# Patient Record
Sex: FEMALE | Race: OTHER
[De-identification: names, ages, dates, MRNs, and addresses within clinical notes are randomized per-mention and may not be internally consistent; named-entity substitution may affect disease eponyms.]

---

## 2019-03-01 ENCOUNTER — HOSPITAL ENCOUNTER (OUTPATIENT)
Dept: HOSPITAL 15 - LDRP | Age: 29
Setting detail: OBSERVATION
Discharge: HOME | DRG: 566 | End: 2019-03-01
Attending: SPECIALIST | Admitting: SPECIALIST
Payer: MEDICAID

## 2019-03-01 VITALS — BODY MASS INDEX: 25.52 KG/M2 | WEIGHT: 144 LBS | HEIGHT: 63 IN

## 2019-03-01 DIAGNOSIS — Z3A.38: ICD-10-CM

## 2019-03-01 DIAGNOSIS — O99.113: Primary | ICD-10-CM

## 2019-03-01 PROCEDURE — 81002 URINALYSIS NONAUTO W/O SCOPE: CPT

## 2019-03-01 PROCEDURE — 76818 FETAL BIOPHYS PROFILE W/NST: CPT

## 2019-03-01 PROCEDURE — 96372 THER/PROPH/DIAG INJ SC/IM: CPT

## 2019-03-01 PROCEDURE — 59025 FETAL NON-STRESS TEST: CPT

## 2019-03-02 ENCOUNTER — HOSPITAL ENCOUNTER (OUTPATIENT)
Dept: HOSPITAL 15 - LDRP | Age: 29
Setting detail: OBSERVATION
Discharge: HOME | DRG: 861 | End: 2019-03-02
Attending: SPECIALIST | Admitting: SPECIALIST
Payer: MEDICAID

## 2019-03-02 DIAGNOSIS — Z34.93: Primary | ICD-10-CM

## 2019-03-02 DIAGNOSIS — Z3A.35: ICD-10-CM

## 2019-03-02 PROCEDURE — 96361 HYDRATE IV INFUSION ADD-ON: CPT

## 2019-03-02 PROCEDURE — 76818 FETAL BIOPHYS PROFILE W/NST: CPT

## 2019-03-02 PROCEDURE — 59025 FETAL NON-STRESS TEST: CPT

## 2019-03-02 PROCEDURE — 81002 URINALYSIS NONAUTO W/O SCOPE: CPT

## 2019-03-02 PROCEDURE — 96366 THER/PROPH/DIAG IV INF ADDON: CPT

## 2019-03-03 ENCOUNTER — HOSPITAL ENCOUNTER (OUTPATIENT)
Dept: HOSPITAL 15 - LDRP | Age: 29
Setting detail: OBSERVATION
Discharge: HOME | DRG: 566 | End: 2019-03-03
Attending: SPECIALIST | Admitting: SPECIALIST
Payer: MEDICAID

## 2019-03-03 DIAGNOSIS — O41.03X0: Primary | ICD-10-CM

## 2019-03-03 DIAGNOSIS — Z3A.35: ICD-10-CM

## 2019-03-03 PROCEDURE — 59025 FETAL NON-STRESS TEST: CPT

## 2019-03-03 PROCEDURE — 76818 FETAL BIOPHYS PROFILE W/NST: CPT

## 2019-03-03 PROCEDURE — 81002 URINALYSIS NONAUTO W/O SCOPE: CPT

## 2019-03-06 ENCOUNTER — HOSPITAL ENCOUNTER (OUTPATIENT)
Dept: HOSPITAL 15 - LDRP | Age: 29
Setting detail: OBSERVATION
Discharge: HOME | DRG: 566 | End: 2019-03-06
Attending: SPECIALIST | Admitting: SPECIALIST
Payer: MEDICAID

## 2019-03-06 DIAGNOSIS — Z3A.36: ICD-10-CM

## 2019-03-06 DIAGNOSIS — O41.03X0: Primary | ICD-10-CM

## 2019-03-06 PROCEDURE — 76818 FETAL BIOPHYS PROFILE W/NST: CPT

## 2019-03-06 PROCEDURE — 59025 FETAL NON-STRESS TEST: CPT

## 2019-03-06 PROCEDURE — 81002 URINALYSIS NONAUTO W/O SCOPE: CPT

## 2019-03-07 ENCOUNTER — HOSPITAL ENCOUNTER (OUTPATIENT)
Dept: HOSPITAL 15 - LDRP | Age: 29
Setting detail: OBSERVATION
Discharge: HOME | DRG: 566 | End: 2019-03-07
Attending: SPECIALIST | Admitting: SPECIALIST
Payer: MEDICAID

## 2019-03-07 DIAGNOSIS — Z3A.32: ICD-10-CM

## 2019-03-07 DIAGNOSIS — O26.893: Primary | ICD-10-CM

## 2019-03-07 PROCEDURE — 81002 URINALYSIS NONAUTO W/O SCOPE: CPT

## 2019-03-07 PROCEDURE — 76818 FETAL BIOPHYS PROFILE W/NST: CPT

## 2019-03-07 PROCEDURE — 59025 FETAL NON-STRESS TEST: CPT

## 2019-03-09 ENCOUNTER — HOSPITAL ENCOUNTER (INPATIENT)
Dept: HOSPITAL 15 - LDRP | Age: 29
LOS: 3 days | Discharge: HOME | DRG: 540 | End: 2019-03-12
Attending: SPECIALIST | Admitting: SPECIALIST
Payer: MEDICAID

## 2019-03-09 VITALS — SYSTOLIC BLOOD PRESSURE: 135 MMHG | DIASTOLIC BLOOD PRESSURE: 93 MMHG

## 2019-03-09 VITALS — WEIGHT: 144 LBS | HEIGHT: 64 IN | BODY MASS INDEX: 24.59 KG/M2

## 2019-03-09 VITALS — SYSTOLIC BLOOD PRESSURE: 126 MMHG | DIASTOLIC BLOOD PRESSURE: 73 MMHG

## 2019-03-09 VITALS — DIASTOLIC BLOOD PRESSURE: 88 MMHG | SYSTOLIC BLOOD PRESSURE: 125 MMHG

## 2019-03-09 VITALS — SYSTOLIC BLOOD PRESSURE: 127 MMHG | DIASTOLIC BLOOD PRESSURE: 76 MMHG

## 2019-03-09 DIAGNOSIS — O36.5930: Primary | ICD-10-CM

## 2019-03-09 DIAGNOSIS — Z3A.36: ICD-10-CM

## 2019-03-09 DIAGNOSIS — O41.03X0: ICD-10-CM

## 2019-03-09 LAB
ALBUMIN SERPL-MCNC: 2.5 G/DL (ref 3.4–5)
ALCOHOL, URINE: < 3 MG/DL (ref 0–5)
ALP SERPL-CCNC: 103 U/L (ref 45–117)
ALT SERPL-CCNC: 15 U/L (ref 13–56)
AMPHETAMINES UR QL SCN: NEGATIVE
ANION GAP SERPL CALCULATED.3IONS-SCNC: 11 MMOL/L (ref 5–15)
APTT PPP: 26.2 SEC (ref 23.78–33.04)
BARBITURATES UR QL SCN: NEGATIVE
BENZODIAZ UR QL SCN: NEGATIVE
BILIRUB SERPL-MCNC: 0.2 MG/DL (ref 0.2–1)
BUN SERPL-MCNC: 13 MG/DL (ref 7–18)
BUN/CREAT SERPL: 22.8
BZE UR QL SCN: NEGATIVE
CALCIUM SERPL-MCNC: 8.5 MG/DL (ref 8.5–10.1)
CANNABINOIDS UR QL SCN: NEGATIVE
CHLORIDE SERPL-SCNC: 106 MMOL/L (ref 98–107)
CO2 SERPL-SCNC: 20 MMOL/L (ref 21–32)
GLUCOSE SERPL-MCNC: 101 MG/DL (ref 74–106)
HCT VFR BLD AUTO: 35.4 % (ref 36–46)
HGB BLD-MCNC: 11.9 G/DL (ref 12.2–16.2)
INR PPP: 0.83 (ref 0.9–1.15)
MCH RBC QN AUTO: 30.1 PG (ref 28–32)
MCV RBC AUTO: 89.5 FL (ref 80–100)
OPIATES UR QL SCN: NEGATIVE
PCP UR QL SCN: NEGATIVE
POTASSIUM SERPL-SCNC: 4.5 MMOL/L (ref 3.5–5.1)
PROT SERPL-MCNC: 6.6 G/DL (ref 6.4–8.2)
PROTHROMBIN TIME: 9 SEC (ref 9.27–12.13)
SODIUM SERPL-SCNC: 137 MMOL/L (ref 136–145)

## 2019-03-09 PROCEDURE — 96366 THER/PROPH/DIAG IV INF ADDON: CPT

## 2019-03-09 PROCEDURE — 94762 N-INVAS EAR/PLS OXIMTRY CONT: CPT

## 2019-03-09 PROCEDURE — 85007 BL SMEAR W/DIFF WBC COUNT: CPT

## 2019-03-09 PROCEDURE — 81001 URINALYSIS AUTO W/SCOPE: CPT

## 2019-03-09 PROCEDURE — 59025 FETAL NON-STRESS TEST: CPT

## 2019-03-09 PROCEDURE — 86762 RUBELLA ANTIBODY: CPT

## 2019-03-09 PROCEDURE — 81002 URINALYSIS NONAUTO W/O SCOPE: CPT

## 2019-03-09 PROCEDURE — 80307 DRUG TEST PRSMV CHEM ANLYZR: CPT

## 2019-03-09 PROCEDURE — 96361 HYDRATE IV INFUSION ADD-ON: CPT

## 2019-03-09 PROCEDURE — 96365 THER/PROPH/DIAG IV INF INIT: CPT

## 2019-03-09 PROCEDURE — 86850 RBC ANTIBODY SCREEN: CPT

## 2019-03-09 PROCEDURE — 80053 COMPREHEN METABOLIC PANEL: CPT

## 2019-03-09 PROCEDURE — 86900 BLOOD TYPING SEROLOGIC ABO: CPT

## 2019-03-09 PROCEDURE — 86901 BLOOD TYPING SEROLOGIC RH(D): CPT

## 2019-03-09 PROCEDURE — 36415 COLL VENOUS BLD VENIPUNCTURE: CPT

## 2019-03-09 PROCEDURE — 85027 COMPLETE CBC AUTOMATED: CPT

## 2019-03-09 PROCEDURE — 51702 INSERT TEMP BLADDER CATH: CPT

## 2019-03-09 PROCEDURE — 85730 THROMBOPLASTIN TIME PARTIAL: CPT

## 2019-03-09 PROCEDURE — 76818 FETAL BIOPHYS PROFILE W/NST: CPT

## 2019-03-09 PROCEDURE — 96375 TX/PRO/DX INJ NEW DRUG ADDON: CPT

## 2019-03-09 PROCEDURE — 86592 SYPHILIS TEST NON-TREP QUAL: CPT

## 2019-03-09 PROCEDURE — 85610 PROTHROMBIN TIME: CPT

## 2019-03-09 PROCEDURE — 85025 COMPLETE CBC W/AUTO DIFF WBC: CPT

## 2019-03-09 RX ADMIN — SODIUM CHLORIDE, SODIUM LACTATE, POTASSIUM CHLORIDE, AND CALCIUM CHLORIDE SCH MLS/HR: .6; .31; .03; .02 INJECTION, SOLUTION INTRAVENOUS at 19:58

## 2019-03-09 NOTE — NUR
Post Op  for LDRP:

Received patient from PACU via bed to room .  Patient A/A/Ox4, abdominal binder and 
bilateral SCD's are in place, IV fluids placed on pump and infusing per order, incisional 
site dressing clean/dry/intact and Burdick Catheter to gravity draining clear yellow urine.  
Incentive Spirometer at bedside and instruction on proper use with return demonstration done 
by patient.

## 2019-03-10 VITALS — SYSTOLIC BLOOD PRESSURE: 142 MMHG | DIASTOLIC BLOOD PRESSURE: 90 MMHG

## 2019-03-10 VITALS — SYSTOLIC BLOOD PRESSURE: 119 MMHG | DIASTOLIC BLOOD PRESSURE: 72 MMHG

## 2019-03-10 VITALS — SYSTOLIC BLOOD PRESSURE: 131 MMHG | DIASTOLIC BLOOD PRESSURE: 72 MMHG

## 2019-03-10 VITALS — DIASTOLIC BLOOD PRESSURE: 81 MMHG | SYSTOLIC BLOOD PRESSURE: 117 MMHG

## 2019-03-10 VITALS — DIASTOLIC BLOOD PRESSURE: 81 MMHG | SYSTOLIC BLOOD PRESSURE: 110 MMHG

## 2019-03-10 VITALS — DIASTOLIC BLOOD PRESSURE: 83 MMHG | SYSTOLIC BLOOD PRESSURE: 137 MMHG

## 2019-03-10 VITALS — DIASTOLIC BLOOD PRESSURE: 85 MMHG | SYSTOLIC BLOOD PRESSURE: 135 MMHG

## 2019-03-10 VITALS — SYSTOLIC BLOOD PRESSURE: 134 MMHG | DIASTOLIC BLOOD PRESSURE: 86 MMHG

## 2019-03-10 VITALS — SYSTOLIC BLOOD PRESSURE: 107 MMHG | DIASTOLIC BLOOD PRESSURE: 63 MMHG

## 2019-03-10 VITALS — SYSTOLIC BLOOD PRESSURE: 121 MMHG | DIASTOLIC BLOOD PRESSURE: 77 MMHG

## 2019-03-10 LAB
HCT VFR BLD AUTO: 31.4 % (ref 36–46)
HGB BLD-MCNC: 10.3 G/DL (ref 12.2–16.2)
MCH RBC QN AUTO: 29.7 PG (ref 28–32)
MCV RBC AUTO: 90.3 FL (ref 80–100)
NRBC BLD QL AUTO: 0 %

## 2019-03-10 RX ADMIN — SODIUM CHLORIDE, SODIUM LACTATE, POTASSIUM CHLORIDE, AND CALCIUM CHLORIDE SCH MLS/HR: .6; .31; .03; .02 INJECTION, SOLUTION INTRAVENOUS at 00:13

## 2019-03-10 RX ADMIN — KETOROLAC TROMETHAMINE SCH MG: 30 INJECTION, SOLUTION INTRAMUSCULAR; INTRAVENOUS at 06:02

## 2019-03-10 RX ADMIN — IBUPROFEN PRN MG: 800 TABLET ORAL at 17:22

## 2019-03-10 RX ADMIN — KETOROLAC TROMETHAMINE SCH MG: 30 INJECTION, SOLUTION INTRAMUSCULAR; INTRAVENOUS at 00:08

## 2019-03-10 RX ADMIN — HYDROCODONE BITARTRATE AND ACETAMINOPHEN PRN TAB: 5; 325 TABLET ORAL at 19:47

## 2019-03-10 RX ADMIN — CEFAZOLIN SODIUM SCH MLS/HR: 1 INJECTION, SOLUTION INTRAVENOUS at 19:47

## 2019-03-10 RX ADMIN — CEFAZOLIN SODIUM SCH MLS/HR: 1 INJECTION, SOLUTION INTRAVENOUS at 03:43

## 2019-03-10 RX ADMIN — CEFAZOLIN SODIUM SCH MLS/HR: 1 INJECTION, SOLUTION INTRAVENOUS at 11:20

## 2019-03-10 RX ADMIN — SODIUM CHLORIDE, SODIUM LACTATE, POTASSIUM CHLORIDE, AND CALCIUM CHLORIDE SCH MLS/HR: .6; .31; .03; .02 INJECTION, SOLUTION INTRAVENOUS at 11:20

## 2019-03-10 RX ADMIN — HYDROCODONE BITARTRATE AND ACETAMINOPHEN PRN TAB: 5; 325 TABLET ORAL at 23:46

## 2019-03-10 NOTE — NUR
Burdick catheter dc'd

 Burdick dc'd  with clean technique following deflation of balloon. Patient tolerated well 
with no complaints of pain. Continue care.

## 2019-03-10 NOTE — NUR
Ambulation:

Patient OOB with standby assistance by RN.  Patient ambulated to chair with steady gait.  
Clean gown provided and bed linen changed.  Patient ambulated back to bed with steady gait 
and no distress noted.

## 2019-03-10 NOTE — NUR
DRESSING TO LOWER ABDOMINAL INCISION REMOVED

DRESSING TO LOWER ABDOMINAL INCISION REMOVED. INCISION CLEAN, DRY AND WELL APPROXIMATED WITH 
12 STAPLES PRESENT AND INTACT. SITE LEFT OPEN TO AIR. 

-------------------------------------------------------------------------------

Addendum: 03/10/19 at 2031 by Leia Coe RN

-------------------------------------------------------------------------------

Amended: Links added.

## 2019-03-11 VITALS — DIASTOLIC BLOOD PRESSURE: 82 MMHG | SYSTOLIC BLOOD PRESSURE: 126 MMHG

## 2019-03-11 VITALS — DIASTOLIC BLOOD PRESSURE: 64 MMHG | SYSTOLIC BLOOD PRESSURE: 108 MMHG

## 2019-03-11 VITALS — DIASTOLIC BLOOD PRESSURE: 78 MMHG | SYSTOLIC BLOOD PRESSURE: 112 MMHG

## 2019-03-11 VITALS — SYSTOLIC BLOOD PRESSURE: 130 MMHG | DIASTOLIC BLOOD PRESSURE: 84 MMHG

## 2019-03-11 VITALS — SYSTOLIC BLOOD PRESSURE: 141 MMHG | DIASTOLIC BLOOD PRESSURE: 87 MMHG

## 2019-03-11 VITALS — DIASTOLIC BLOOD PRESSURE: 92 MMHG | SYSTOLIC BLOOD PRESSURE: 139 MMHG

## 2019-03-11 LAB
HCT VFR BLD AUTO: 27.8 % (ref 36–46)
HGB BLD-MCNC: 9.3 G/DL (ref 12.2–16.2)
MCH RBC QN AUTO: 30.1 PG (ref 28–32)
MCV RBC AUTO: 90.1 FL (ref 80–100)
NRBC BLD QL AUTO: 0 %

## 2019-03-11 RX ADMIN — IBUPROFEN PRN MG: 800 TABLET ORAL at 01:44

## 2019-03-11 RX ADMIN — IBUPROFEN PRN MG: 800 TABLET ORAL at 19:26

## 2019-03-11 RX ADMIN — HYDROCODONE BITARTRATE AND ACETAMINOPHEN PRN TAB: 5; 325 TABLET ORAL at 15:29

## 2019-03-11 RX ADMIN — HYDROCODONE BITARTRATE AND ACETAMINOPHEN PRN TAB: 5; 325 TABLET ORAL at 04:42

## 2019-03-11 RX ADMIN — HYDROCODONE BITARTRATE AND ACETAMINOPHEN PRN TAB: 5; 325 TABLET ORAL at 20:28

## 2019-03-11 RX ADMIN — HYDROCODONE BITARTRATE AND ACETAMINOPHEN PRN TAB: 5; 325 TABLET ORAL at 09:15

## 2019-03-11 RX ADMIN — IBUPROFEN PRN MG: 800 TABLET ORAL at 11:22

## 2019-03-11 NOTE — NUR
Initiated assessment, reviewed POC and discussed goals. Pt verbalized understanding, 
questions answered. Pt states she is at 4/10 pain and that is tolerable. Left hand saline 
lock discontinued, catheter in tact, bandage applied, pt tolerated well. Pt stated she will 
ambulate hallway 4 "laps" and then is requesting Motrin. Family support at bedside. See flow 
sheet for complete data, and MAR for future medication administration.

## 2019-03-12 VITALS — SYSTOLIC BLOOD PRESSURE: 126 MMHG | DIASTOLIC BLOOD PRESSURE: 90 MMHG

## 2019-03-12 VITALS — DIASTOLIC BLOOD PRESSURE: 77 MMHG | SYSTOLIC BLOOD PRESSURE: 116 MMHG

## 2019-03-12 LAB — RUBV IGG SERPL IA-ACNC: 8.08 INDEX

## 2019-03-12 RX ADMIN — HYDROCODONE BITARTRATE AND ACETAMINOPHEN PRN TAB: 5; 325 TABLET ORAL at 04:35

## 2019-03-12 RX ADMIN — HYDROCODONE BITARTRATE AND ACETAMINOPHEN PRN TAB: 5; 325 TABLET ORAL at 00:17

## 2019-03-12 NOTE — NUR
C/S Staple Removal DC Note:

Orders received to remove staples.  Staples removed using sterile technique by KALI OLSON 
midwife.  Lower abdominal incision approximated, no drainage/redness/inflammation visualized 
at time of removal.  Steri-strips applied.  Education provided on incisional care.  Patient 
verbalized understanding and willingness to comply to instructions/teaching provided. 
Discharge:

Discharge instructions given as ordered. Pt encouraged to follow up with OB/GYN as 
instructed.  All questions and concerns addressed.  Patient verbalized understanding.  
Medication reconciliation completed and copy given to patient. Patient refused influenza and 
t-dap vaccine.  Patient encouraged to prepare to depart unit.

## 2019-03-12 NOTE — NUR
Patient vomited 100cc's as reported by pt. Pt states her pain is so bad she through up. 
Crackers and water provided and pain medication administered. Patient educated and 
encouraged to ambulate and drink plenty of water. Pt also encouraged to accept a ducolox 
suppository to aid with her abdominal discomfort. Pt declined and refused to ambulate and to 
take suppository. Pt stated she was tired and wanted to lay down. this RN verbalized 
understanding.

## 2019-03-12 NOTE — NUR
Discharge:

Patient taken to vehicle via ambulation with all personal belongings, accompanied by staff 
and family member.  No distress noted at time of departure, no adverse changes in status 
since initial assessment.

## 2020-04-10 ENCOUNTER — HOSPITAL ENCOUNTER (INPATIENT)
Dept: HOSPITAL 15 - LDRP | Age: 30
LOS: 3 days | Discharge: HOME | DRG: 540 | End: 2020-04-13
Attending: OBSTETRICS & GYNECOLOGY | Admitting: OBSTETRICS & GYNECOLOGY
Payer: MEDICAID

## 2020-04-10 VITALS — SYSTOLIC BLOOD PRESSURE: 119 MMHG | DIASTOLIC BLOOD PRESSURE: 67 MMHG

## 2020-04-10 VITALS — DIASTOLIC BLOOD PRESSURE: 75 MMHG | SYSTOLIC BLOOD PRESSURE: 127 MMHG

## 2020-04-10 VITALS — DIASTOLIC BLOOD PRESSURE: 63 MMHG | SYSTOLIC BLOOD PRESSURE: 115 MMHG

## 2020-04-10 VITALS — SYSTOLIC BLOOD PRESSURE: 119 MMHG | DIASTOLIC BLOOD PRESSURE: 79 MMHG

## 2020-04-10 VITALS — SYSTOLIC BLOOD PRESSURE: 122 MMHG | DIASTOLIC BLOOD PRESSURE: 77 MMHG

## 2020-04-10 VITALS — DIASTOLIC BLOOD PRESSURE: 76 MMHG | SYSTOLIC BLOOD PRESSURE: 110 MMHG

## 2020-04-10 VITALS — SYSTOLIC BLOOD PRESSURE: 124 MMHG | DIASTOLIC BLOOD PRESSURE: 75 MMHG

## 2020-04-10 VITALS — BODY MASS INDEX: 25.61 KG/M2 | HEIGHT: 64 IN | WEIGHT: 150 LBS

## 2020-04-10 VITALS — DIASTOLIC BLOOD PRESSURE: 81 MMHG | SYSTOLIC BLOOD PRESSURE: 111 MMHG

## 2020-04-10 VITALS — DIASTOLIC BLOOD PRESSURE: 63 MMHG | SYSTOLIC BLOOD PRESSURE: 123 MMHG

## 2020-04-10 VITALS — SYSTOLIC BLOOD PRESSURE: 126 MMHG | DIASTOLIC BLOOD PRESSURE: 78 MMHG

## 2020-04-10 VITALS — DIASTOLIC BLOOD PRESSURE: 75 MMHG | SYSTOLIC BLOOD PRESSURE: 117 MMHG

## 2020-04-10 VITALS — SYSTOLIC BLOOD PRESSURE: 118 MMHG | DIASTOLIC BLOOD PRESSURE: 77 MMHG

## 2020-04-10 VITALS — SYSTOLIC BLOOD PRESSURE: 118 MMHG | DIASTOLIC BLOOD PRESSURE: 61 MMHG

## 2020-04-10 DIAGNOSIS — O34.211: Primary | ICD-10-CM

## 2020-04-10 DIAGNOSIS — Z30.2: ICD-10-CM

## 2020-04-10 DIAGNOSIS — Z3A.39: ICD-10-CM

## 2020-04-10 LAB
ALBUMIN SERPL-MCNC: 2.7 G/DL (ref 3.4–5)
ALP SERPL-CCNC: 131 U/L (ref 45–117)
ALT SERPL-CCNC: 10 U/L (ref 13–56)
ANION GAP SERPL CALCULATED.3IONS-SCNC: 10 MMOL/L (ref 5–15)
APTT PPP: 25.4 SEC (ref 23.64–32.05)
BILIRUB SERPL-MCNC: 0.3 MG/DL (ref 0.2–1)
BUN SERPL-MCNC: 15 MG/DL (ref 7–18)
BUN/CREAT SERPL: 22.7
CALCIUM SERPL-MCNC: 8.4 MG/DL (ref 8.5–10.1)
CHLORIDE SERPL-SCNC: 106 MMOL/L (ref 98–107)
CO2 SERPL-SCNC: 20 MMOL/L (ref 21–32)
GLUCOSE SERPL-MCNC: 88 MG/DL (ref 74–106)
HCT VFR BLD AUTO: 30.7 % (ref 36–46)
HCT VFR BLD AUTO: 33.6 % (ref 36–46)
HGB BLD-MCNC: 10.4 G/DL (ref 12.2–16.2)
HGB BLD-MCNC: 11.6 G/DL (ref 12.2–16.2)
INR PPP: 0.91 (ref 0.9–1.15)
MCH RBC QN AUTO: 29.7 PG (ref 28–32)
MCH RBC QN AUTO: 30.1 PG (ref 28–32)
MCV RBC AUTO: 87.1 FL (ref 80–100)
MCV RBC AUTO: 87.2 FL (ref 80–100)
NRBC BLD QL AUTO: 0 %
NRBC BLD QL AUTO: 0.1 %
POTASSIUM SERPL-SCNC: 3.8 MMOL/L (ref 3.5–5.1)
PROT SERPL-MCNC: 6.6 G/DL (ref 6.4–8.2)
SODIUM SERPL-SCNC: 136 MMOL/L (ref 136–145)

## 2020-04-10 PROCEDURE — 96365 THER/PROPH/DIAG IV INF INIT: CPT

## 2020-04-10 PROCEDURE — 85730 THROMBOPLASTIN TIME PARTIAL: CPT

## 2020-04-10 PROCEDURE — 85025 COMPLETE CBC W/AUTO DIFF WBC: CPT

## 2020-04-10 PROCEDURE — 85610 PROTHROMBIN TIME: CPT

## 2020-04-10 PROCEDURE — 84112 EVAL AMNIOTIC FLUID PROTEIN: CPT

## 2020-04-10 PROCEDURE — 86900 BLOOD TYPING SEROLOGIC ABO: CPT

## 2020-04-10 PROCEDURE — 59025 FETAL NON-STRESS TEST: CPT

## 2020-04-10 PROCEDURE — 96366 THER/PROPH/DIAG IV INF ADDON: CPT

## 2020-04-10 PROCEDURE — 86592 SYPHILIS TEST NON-TREP QUAL: CPT

## 2020-04-10 PROCEDURE — 86901 BLOOD TYPING SEROLOGIC RH(D): CPT

## 2020-04-10 PROCEDURE — 86850 RBC ANTIBODY SCREEN: CPT

## 2020-04-10 PROCEDURE — 36415 COLL VENOUS BLD VENIPUNCTURE: CPT

## 2020-04-10 PROCEDURE — 81001 URINALYSIS AUTO W/SCOPE: CPT

## 2020-04-10 PROCEDURE — 96375 TX/PRO/DX INJ NEW DRUG ADDON: CPT

## 2020-04-10 PROCEDURE — 96374 THER/PROPH/DIAG INJ IV PUSH: CPT

## 2020-04-10 PROCEDURE — 80053 COMPREHEN METABOLIC PANEL: CPT

## 2020-04-10 RX ADMIN — CEFAZOLIN SODIUM SCH MLS/HR: 1 INJECTION, SOLUTION INTRAVENOUS at 23:25

## 2020-04-10 RX ADMIN — CEFAZOLIN SODIUM SCH MLS/HR: 1 INJECTION, SOLUTION INTRAVENOUS at 16:18

## 2020-04-10 RX ADMIN — ONDANSETRON HYDROCHLORIDE PRN MG: 2 INJECTION, SOLUTION INTRAMUSCULAR; INTRAVENOUS at 17:52

## 2020-04-10 NOTE — NUR
Ambulation:

Patient ambulated to chair with RN with steady gait. pericare done. Clean gown provided and 
bed linen changed.  Patient ambulated back to bed with steady gait and no distress noted.

## 2020-04-10 NOTE — NUR
Pt arrived to room 7a via own bed. All monitors placed and head to toe assessment initiated 
with OR RN Donell at bedside. Pt states pain 4/10, dressing clean, dry intact, lochia scant, 
and fundus -1u. LR running to right wrist 20 g, no infiltration noted or swelling or pain. 
Lungs clear, Burdick catheter draining to gravity with clear pale yellow urine. RR is 18. See 
flowsheet for complete data.

## 2020-04-11 VITALS — SYSTOLIC BLOOD PRESSURE: 111 MMHG | DIASTOLIC BLOOD PRESSURE: 73 MMHG

## 2020-04-11 VITALS — DIASTOLIC BLOOD PRESSURE: 78 MMHG | SYSTOLIC BLOOD PRESSURE: 120 MMHG

## 2020-04-11 VITALS — SYSTOLIC BLOOD PRESSURE: 125 MMHG | DIASTOLIC BLOOD PRESSURE: 76 MMHG

## 2020-04-11 VITALS — SYSTOLIC BLOOD PRESSURE: 112 MMHG | DIASTOLIC BLOOD PRESSURE: 69 MMHG

## 2020-04-11 VITALS — SYSTOLIC BLOOD PRESSURE: 115 MMHG | DIASTOLIC BLOOD PRESSURE: 83 MMHG

## 2020-04-11 LAB
HCT VFR BLD AUTO: 30.2 % (ref 36–46)
HGB BLD-MCNC: 10.6 G/DL (ref 12.2–16.2)
MCH RBC QN AUTO: 30.3 PG (ref 28–32)
MCV RBC AUTO: 86.5 FL (ref 80–100)
NRBC BLD QL AUTO: 0.1 %

## 2020-04-11 PROCEDURE — 0UL70CZ OCCLUSION OF BILATERAL FALLOPIAN TUBES WITH EXTRALUMINAL DEVICE, OPEN APPROACH: ICD-10-PCS | Performed by: SPECIALIST

## 2020-04-11 RX ADMIN — CEFAZOLIN SODIUM SCH MLS/HR: 1 INJECTION, SOLUTION INTRAVENOUS at 07:36

## 2020-04-11 RX ADMIN — IBUPROFEN PRN MG: 800 TABLET ORAL at 10:08

## 2020-04-11 RX ADMIN — ONDANSETRON HYDROCHLORIDE PRN MG: 2 INJECTION, SOLUTION INTRAMUSCULAR; INTRAVENOUS at 03:02

## 2020-04-11 RX ADMIN — DOCUSATE SODIUM SCH MG: 100 CAPSULE, LIQUID FILLED ORAL at 10:08

## 2020-04-11 RX ADMIN — MORPHINE SULFATE PRN MG: 4 INJECTION, SOLUTION INTRAMUSCULAR; INTRAVENOUS at 07:27

## 2020-04-11 RX ADMIN — IBUPROFEN PRN MG: 800 TABLET ORAL at 17:38

## 2020-04-11 RX ADMIN — DOCUSATE SODIUM SCH MG: 100 CAPSULE, LIQUID FILLED ORAL at 22:26

## 2020-04-11 RX ADMIN — ONDANSETRON HYDROCHLORIDE PRN MG: 2 INJECTION, SOLUTION INTRAMUSCULAR; INTRAVENOUS at 07:27

## 2020-04-11 RX ADMIN — MORPHINE SULFATE PRN MG: 4 INJECTION, SOLUTION INTRAMUSCULAR; INTRAVENOUS at 03:03

## 2020-04-11 NOTE — NUR
Dressing to lower abdominal incision removed. Staples intact, well approximated, incision 
left open to air.

## 2020-04-11 NOTE — NUR
DR BEAN CALLS INTO [facility]. SBAR DISCUSSED INCLUDING PT DENIES PASSING GAS AT THIS TIME. 
ORDER RECEIVED TO CONTINUE WITH CLEAR LIQUID DIET UNTIL PT PASSES GAS, THEN ADVANCE TO SOFT 
DIET. WILL CARRY OUT ORDERS.

## 2020-04-12 VITALS — SYSTOLIC BLOOD PRESSURE: 130 MMHG | DIASTOLIC BLOOD PRESSURE: 77 MMHG

## 2020-04-12 VITALS — SYSTOLIC BLOOD PRESSURE: 113 MMHG | DIASTOLIC BLOOD PRESSURE: 58 MMHG

## 2020-04-12 VITALS — DIASTOLIC BLOOD PRESSURE: 66 MMHG | SYSTOLIC BLOOD PRESSURE: 108 MMHG

## 2020-04-12 VITALS — SYSTOLIC BLOOD PRESSURE: 110 MMHG | DIASTOLIC BLOOD PRESSURE: 63 MMHG

## 2020-04-12 VITALS — SYSTOLIC BLOOD PRESSURE: 125 MMHG | DIASTOLIC BLOOD PRESSURE: 74 MMHG

## 2020-04-12 VITALS — SYSTOLIC BLOOD PRESSURE: 135 MMHG | DIASTOLIC BLOOD PRESSURE: 77 MMHG

## 2020-04-12 RX ADMIN — SIMETHICONE CHEW TAB 80 MG PRN MG: 80 TABLET ORAL at 13:20

## 2020-04-12 RX ADMIN — IBUPROFEN PRN MG: 800 TABLET ORAL at 21:31

## 2020-04-12 RX ADMIN — DOCUSATE SODIUM SCH MG: 100 CAPSULE, LIQUID FILLED ORAL at 21:31

## 2020-04-12 RX ADMIN — SIMETHICONE CHEW TAB 80 MG PRN MG: 80 TABLET ORAL at 21:31

## 2020-04-12 RX ADMIN — BISACODYL PRN MG: 10 SUPPOSITORY RECTAL at 16:37

## 2020-04-12 RX ADMIN — DOCUSATE SODIUM SCH MG: 100 CAPSULE, LIQUID FILLED ORAL at 10:42

## 2020-04-12 RX ADMIN — IBUPROFEN PRN MG: 800 TABLET ORAL at 05:28

## 2020-04-12 NOTE — NUR
IV removal

IV DC'd with patients request with clean sterile technique, catheter fully intact. Pressure 
dressing applied to site. Patient tolerated well. IV taken out before patient showered. 

NOTE:

## 2020-04-13 VITALS — SYSTOLIC BLOOD PRESSURE: 131 MMHG | DIASTOLIC BLOOD PRESSURE: 93 MMHG

## 2020-04-13 VITALS — DIASTOLIC BLOOD PRESSURE: 86 MMHG | SYSTOLIC BLOOD PRESSURE: 128 MMHG

## 2020-04-13 VITALS — SYSTOLIC BLOOD PRESSURE: 115 MMHG | DIASTOLIC BLOOD PRESSURE: 73 MMHG

## 2020-04-13 VITALS — SYSTOLIC BLOOD PRESSURE: 130 MMHG | DIASTOLIC BLOOD PRESSURE: 81 MMHG

## 2020-04-13 VITALS — DIASTOLIC BLOOD PRESSURE: 93 MMHG | SYSTOLIC BLOOD PRESSURE: 131 MMHG

## 2020-04-13 VITALS — DIASTOLIC BLOOD PRESSURE: 73 MMHG | SYSTOLIC BLOOD PRESSURE: 128 MMHG

## 2020-04-13 RX ADMIN — BISACODYL PRN MG: 10 SUPPOSITORY RECTAL at 10:45

## 2020-04-13 RX ADMIN — IBUPROFEN PRN MG: 800 TABLET ORAL at 05:02

## 2020-04-13 RX ADMIN — DOCUSATE SODIUM SCH MG: 100 CAPSULE, LIQUID FILLED ORAL at 10:46

## 2020-04-13 RX ADMIN — IBUPROFEN PRN MG: 800 TABLET ORAL at 13:59

## 2020-04-13 NOTE — NUR
Discharge:

Discharge instructions given as ordered. Pt encouraged to follow up with OB/GYN as 
instructed.  All questions and concerns addressed.  Patient verbalized understanding.  
Medication reconciliation completed and copy given to patient. Patient given prescription at 
this time.  Patient encouraged to prepare to depart unit.

## 2020-04-13 NOTE — NUR
SBAR given to Jermain by charge nurse Eunice TOVAR. Update on patient status given. Orders 
received to d/c patient home and to follow up with  as scheduled. Will follow orders.

## 2020-04-13 NOTE — NUR
Dr Tena called, given update on PT status regarding discharge. PT has not had a BM, having 
flatus and on a clear diet. Diet was changed to regular for today. Dr Tena wants PT to be 
evaluated tonight and possibly discharged by Midwife if PT has a BM, if not she will 
discharged PT tomorrow.

## 2020-04-13 NOTE — NUR
Discharge:

Patient ambulates to vehicle as requested per patient with all personal belongings, 
accompanied by staff, father of baby and  in carseat.  No distress noted at time of 
departure, no adverse changes in status since initial assessment.